# Patient Record
Sex: MALE | URBAN - METROPOLITAN AREA
[De-identification: names, ages, dates, MRNs, and addresses within clinical notes are randomized per-mention and may not be internally consistent; named-entity substitution may affect disease eponyms.]

---

## 2017-07-31 ENCOUNTER — OFFICE VISIT (OUTPATIENT)
Dept: ORTHOPEDIC SURGERY | Age: 37
End: 2017-07-31

## 2017-07-31 VITALS
SYSTOLIC BLOOD PRESSURE: 128 MMHG | HEIGHT: 74 IN | HEART RATE: 71 BPM | OXYGEN SATURATION: 98 % | RESPIRATION RATE: 14 BRPM | DIASTOLIC BLOOD PRESSURE: 86 MMHG | WEIGHT: 210 LBS | BODY MASS INDEX: 26.95 KG/M2

## 2017-07-31 DIAGNOSIS — M25.571 RIGHT ANKLE PAIN, UNSPECIFIED CHRONICITY: ICD-10-CM

## 2017-07-31 DIAGNOSIS — S93.411A SPRAIN OF CALCANEOFIBULAR LIGAMENT OF RIGHT ANKLE, INITIAL ENCOUNTER: Primary | ICD-10-CM

## 2017-07-31 DIAGNOSIS — M79.671 RIGHT FOOT PAIN: ICD-10-CM

## 2017-07-31 NOTE — MR AVS SNAPSHOT
Visit Information Date & Time Provider Department Dept. Phone Encounter #  
 7/31/2017  9:00 AM Kenneth Miranda, [facility] [phone] [id number] Follow-up Instructions Return in about 3 weeks (around 8/21/2017). Follow-up and Disposition History Upcoming Health Maintenance Date Due DTaP/Tdap/Td series (1 - Tdap) 3/17/2000 INFLUENZA AGE 9 TO ADULT 8/1/2017 Allergies as of 7/31/2017  Review Complete On: 7/31/2017 By: Kenneth Miranda MD  
 No Known Allergies Current Immunizations  Never Reviewed No immunizations on file. Not reviewed this visit You Were Diagnosed With   
  
 Codes Comments Sprain of calcaneofibular ligament of right ankle, initial encounter    -  Primary ICD-10-CM: A15.221N ICD-9-CM: 845.02 Right foot pain     ICD-10-CM: M79.671 ICD-9-CM: 729.5 Right ankle pain, unspecified chronicity     ICD-10-CM: M25.571 ICD-9-CM: 719.47 Vitals BP Pulse Resp Height(growth percentile) Weight(growth percentile) SpO2  
 128/86 71 14 6' 2\" (1.88 m) 210 lb (95.3 kg) 98% BMI Smoking Status 26.96 kg/m2 Current Every Day Smoker Vitals History BMI and BSA Data Body Mass Index Body Surface Area  
 26.96 kg/m 2 2.23 m 2 Your Updated Medication List  
  
Notice  As of 7/31/2017 10:39 AM  
 You have not been prescribed any medications. We Performed the Following AMB POC XRAY, FOOT; COMPLETE, 3+ VIEW [92377 CPT(R)] AMB SUPPLY ORDER [[id number] Custom] Comments:  
 Custom right hinged AFO brace POC XRAY, ANKLE; 2 VIEWS [02115 CPT(R)] REFERRAL TO PHYSICAL THERAPY [XAR27 Custom] Comments:  
 Evaluation and treatment of right ankle sprain Follow-up Instructions Return in about 3 weeks (around 8/21/2017). Referral Information Referral ID Referred By Referred To [id number] [address] [facility] [address] [facility] VIEW   
   [address] [facility] [address] [place], [address] [place] [address] Phone: [phone] Fax: [phone] Visits Status Start Date End Date 1 New Request 7/31/17 7/31/18 If your referral has a status of pending review or denied, additional information will be sent to support the outcome of this decision. Patient Instructions Please follow up in 3 weeks. You are advised to contact us if your condition worsens. Ankle Sprain: Care Instructions Your Care Instructions An ankle sprain can happen when you twist your ankle. The ligaments that support the ankle can get stretched and torn. Often the ankle is swollen and painful. Ankle sprains may take from several weeks to several months to heal. Usually, the more pain and swelling you have, the more severe your ankle sprain is and the longer it will take to heal. You can heal faster and regain strength in your ankle with good home treatment. It is very important to give your ankle time to heal completely, so that you do not easily hurt your ankle again. Follow-up care is a key part of your treatment and safety. Be sure to make and go to all appointments, and call your doctor if you are having problems. It's also a good idea to know your test results and keep a list of the medicines you take. How can you care for yourself at home? · Prop up your foot on pillows as much as possible for the next 3 days. Try to keep your ankle above the level of your heart. This will help reduce the swelling. · Follow your doctor's directions for wearing a splint or elastic bandage. Wrapping the ankle may help reduce or prevent swelling. · Your doctor may give you a splint, a brace, an air stirrup, or another form of ankle support to protect your ankle until it is healed. Wear it as directed while your ankle is healing.  Do not remove it unless your doctor tells you to. After your ankle has healed, ask your doctor whether you should wear the brace when you exercise. · Put ice or cold packs on your injured ankle for 10 to 20 minutes at a time. Try to do this every 1 to 2 hours for the next 3 days (when you are awake) or until the swelling goes down. Put a thin cloth between the ice and your skin. · You may need to use crutches until you can walk without pain. If you do use crutches, try to bear some weight on your injured ankle if you can do so without pain. This helps the ankle heal. 
· Take pain medicines exactly as directed. ¨ If the doctor gave you a prescription medicine for pain, take it as prescribed. ¨ If you are not taking a prescription pain medicine, ask your doctor if you can take an over-the-counter medicine. · If you have been given ankle exercises to do at home, do them exactly as instructed. These can promote healing and help prevent lasting weakness. When should you call for help? Call your doctor now or seek immediate medical care if: 
· Your pain is getting worse. · Your swelling is getting worse. · Your splint feels too tight or you are unable to loosen it. Watch closely for changes in your health, and be sure to contact your doctor if: 
· You are not getting better after 1 week. Where can you learn more? Go to http://naa-brijesh.info/. Enter P420 in the search box to learn more about \"Ankle Sprain: Care Instructions. \" Current as of: March 21, 2017 Content Version: 11.3 © 9627-3176 Healthwise, Incorporated. Care instructions adapted under license by [facility] (which disclaims liability or warranty for this information). If you have questions about a medical condition or this instruction, always ask your healthcare professional. [place] [address] any warranty or liability for your use of this information. Introducing [facility]! Ramandeep Mcdonald introduces [facility] patient portal. Now you can access parts of your medical record, email your doctor's office, and request medication refills online. 1. In your internet browser, go to [email or web address] 2. Click on the First Time User? Click Here link in the Sign In box. You will see the New Member Sign Up page. 3. Enter your [facility] Access Code exactly as it appears below. You will not need to use this code after youve completed the sign-up process. If you do not sign up before the expiration date, you must request a new code. · [facility] Access Code: [email or web address] Expires: 10/29/2017 10:32 AM 
 
4. Enter the last four digits of your Social Security Number (xxxx) and Date of Birth (mm/dd/yyyy) as indicated and click Submit. You will be taken to the next sign-up page. 5. Create a [facility] ID. This will be your [facility] login ID and cannot be changed, so think of one that is secure and easy to remember. 6. Create a [facility] password. You can change your password at any time. 7. Enter your Password Reset Question and Answer. This can be used at a later time if you forget your password. 8. Enter your e-mail address. You will receive e-mail notification when new information is available in [address] 9. Click Sign Up. You can now view and download portions of your medical record. 10. Click the Download Summary menu link to download a portable copy of your medical information. If you have questions, please visit the Frequently Asked Questions section of the [facility] [facility] website. Remember, [facility] is NOT to be used for urgent needs. For medical emergencies, dial 911. Now available from your iPhone and Android! Please provide this summary of care documentation to your next provider. If you have any questions after today's visit, please call [phone].

## 2017-07-31 NOTE — LETTER
NOTIFICATION RETURN TO WORK / SCHOOL 
 
7/31/2017 10:33 AM 
 
Mr. Ruth Dixon [facility] 
[address] [facility] [phone] [id number] To Whom It May Concern: 
 
Ruth Dixon is currently under the care of [address] [place] [address] [address]. Mr. Shira Quigley presents to my office on 7/31/2017 for evaluation and treatment of his right foot. Please allow Mr. Shira Quigley to continue working on light duty until follow-up. If there are questions or concerns please have the patient contact our office.  
 
 
 
Sincerely, 
 
 
Kristi Ralph MD

## 2017-07-31 NOTE — PATIENT INSTRUCTIONS
Please follow up in 3 weeks. You are advised to contact us if your condition worsens. Ankle Sprain: Care Instructions  Your Care Instructions    An ankle sprain can happen when you twist your ankle. The ligaments that support the ankle can get stretched and torn. Often the ankle is swollen and painful. Ankle sprains may take from several weeks to several months to heal. Usually, the more pain and swelling you have, the more severe your ankle sprain is and the longer it will take to heal. You can heal faster and regain strength in your ankle with good home treatment. It is very important to give your ankle time to heal completely, so that you do not easily hurt your ankle again. Follow-up care is a key part of your treatment and safety. Be sure to make and go to all appointments, and call your doctor if you are having problems. It's also a good idea to know your test results and keep a list of the medicines you take. How can you care for yourself at home? · Prop up your foot on pillows as much as possible for the next 3 days. Try to keep your ankle above the level of your heart. This will help reduce the swelling. · Follow your doctor's directions for wearing a splint or elastic bandage. Wrapping the ankle may help reduce or prevent swelling. · Your doctor may give you a splint, a brace, an air stirrup, or another form of ankle support to protect your ankle until it is healed. Wear it as directed while your ankle is healing. Do not remove it unless your doctor tells you to. After your ankle has healed, ask your doctor whether you should wear the brace when you exercise. · Put ice or cold packs on your injured ankle for 10 to 20 minutes at a time. Try to do this every 1 to 2 hours for the next 3 days (when you are awake) or until the swelling goes down. Put a thin cloth between the ice and your skin. · You may need to use crutches until you can walk without pain.  If you do use crutches, try to bear some weight on your injured ankle if you can do so without pain. This helps the ankle heal.  · Take pain medicines exactly as directed. ¨ If the doctor gave you a prescription medicine for pain, take it as prescribed. ¨ If you are not taking a prescription pain medicine, ask your doctor if you can take an over-the-counter medicine. · If you have been given ankle exercises to do at home, do them exactly as instructed. These can promote healing and help prevent lasting weakness. When should you call for help? Call your doctor now or seek immediate medical care if:  · Your pain is getting worse. · Your swelling is getting worse. · Your splint feels too tight or you are unable to loosen it. Watch closely for changes in your health, and be sure to contact your doctor if:  · You are not getting better after 1 week. Where can you learn more? Go to http://naa-brijesh.info/. Enter T242 in the search box to learn more about \"Ankle Sprain: Care Instructions. \"  Current as of: March 21, 2017  Content Version: 11.3  © 4905-3508 Healthwise, Incorporated. Care instructions adapted under license by [employer] (which disclaims liability or warranty for this information). If you have questions about a medical condition or this instruction, always ask your healthcare professional. [address] any warranty or liability for your use of this information.

## 2017-07-31 NOTE — PROCEDURES
X-rays of the right ankle reveals pes cavus alignment, soft tissue swelling medially and laterally, no osteochondral defect is seen. Foot films, three views, reveal pes cavus alignment and metatarsus adductus, bipartite medial sesamoid, and marked bunion deformity in these nonweightbearing films. No significant spurs are seen to the superior and/or inferior surface of the calcaneus. Attention right ankle, medial malleolar region:  No acute fracture, subluxation, or dislocation is seen when looking at the ankle. The MRI report from [facility] is listed in the body of my report. The MRI did reveal mild bone marrow edema to the inferior talus and medial malleolus anteriorly and posteriorly. No focal fracture. There is a tear of the anterior talofibular ligament, complete tear, and partial tear of the medial deltoid ligament, as there is some edema within the ligament. There is an adjacent bone marrow contusion to the inferior medial malleolus and to the adjacent medial talus.

## 2017-07-31 NOTE — PROGRESS NOTES
AMBULATORY PROGRESS NOTE      Patient: Jose G Echevarria             MRN: [id number]     SSN: [id number] Body mass index is 26.96 kg/(m^2). YOB: 1980     AGE: 44 y.o. EX: male    PCP: No primary care provider on file. IMPRESSION/DIAGNOSIS AND TREATMENT PLAN     DIAGNOSES  1. Sprain of calcaneofibular ligament of right ankle, initial encounter    2. Right foot pain    3. Right ankle pain, unspecified chronicity        Orders Placed This Encounter    AMB SUPPLY ORDER    [64524] Foot Min 3V    [45553] Ankle 2V    REFERRAL TO PHYSICAL THERAPY      Jose G Echevarria understands his diagnoses and the proposed plan. The patient understands he has a severe ankle sprain. It will take a while for the medial malleolar deltoid ligament to heal, as well as the lateral ligaments to heal.  He is going to require a short, hinged, AFO type brace to help protect him for at least the next four to six weeks to allow him to at least to get in to some sort of tennis shoe rather than a CAM walker boot. At this current time, he will sill us extra-articular CAM walker boot and the crutches. We will see him again in three weeks. Plan:    1) Custom Hinged AFO Brace  2) PWB Status  3) Order Theraband  4) Referral to Physical Therapy  5) Work Note    RTO - 3 weeks    HPI AND EXAMINATION     Jose G Echevarria IS A 44 y.o. male who presents to my outpatient office complaining of right ankle pain. He arrives here with an MRI from [facility], as the patient did see Dr. Courtney Morris, in Orthopedics at [facility].  This MRI was indicated due to a right ankle sprain that occurred three weeks ago with a popping sound, and the patient had pain and decreased range of motion and had some swelling of the ankle. HIs MRI of the right ankle was obtained on July 26, 2017, again at [facility].  The overall findings reveal:    1.  Acute, essentially complete tear of the anterior talofibular ligament. 2. Partial tear and edema to the deltoid ligament, adjacent bone marrow contusion to the inferior medial malleolus with bone contusion to the medial talus. 3. Small ankle effusion. 4. Mild tendinosis to the distal portion of the posterior tibial tendon versus sprain. 5. Moderate soft tissue edema. The patient states that he visited Dr. Damián Watts prior to coming into the office today. Mr. Sade Car states that the initial trauma occurred on July 4, 2017. He states that during the time of the trauma he heard a loud pop coming from his ankle. He has returned to work already and states that he is a . He mentions that operating does not require him to use his right foot. Imaging was discussed with the patient. The patient was instructed about using gastrointestinal medication to protect against anti-inflammatory medication. Appearance: Alert, well appearing and pleasant patient who is in no distress, oriented to person, place/time, and who follows commands. Psychiatric: Affect and mood are appropriate. Cardiovascular/Peripheral Vascular: Normal Pulses to each hand and foot    Musculoskeletal:  LOCATION:  Right FOOT/ANKLE  Integumentary: No rashes, skin patches, wounds, or abrasions to the right or left legs       Warm and normal color. No regions of expressible drainage.     Swelling to lateral Ankle mild present    Swelling to Medial Ankle moderate present       Gait: nonambulatory      Tenderness: ATFL/CFL Anterolateral ankle ligaments tenderness is moderate present        Anterior Syndesmosis is not present        Medial deltoid ligament tenderness is present        Peroneal tendon sheath no swelling         4/5 Metatarsal base tenderness is not present        Midfoot tenderness is not present        Achilles tenderness is not present                    Cuboid tenderness is not present         Proximal fibula tenderness: is not present Anteromedial tenderness        Posterior Tibialis tenderness PRESENT       Motor Strength/Tone Exam: Normal to the toes with respect to extension/flexion      Sensory Exam:   Intact Normal Sensation to ankle/foot      Stability Testing: Peroneal tendon instability : Not tested due to pain,   Instability of Ankle/Subtalar: Not tested due to pain,         ROM: Decreased ROM noted to ankle      Decreased Hindfoot (Subtalar, CC, TN regions)        Normal Forefoot toes        Contractures: No Achilles or Gastrocnemius Contractures      Calf tenderness: Absent for calf or gastrocnemius muscle regions       Soft, supple, non tender, non taut lower extremity compartments       Alignment: Pes Cavus  Wounds/Abrasions:   None present  Extremities:   No embolic phenomena to the toes or hands         No significant edema to the foot and or toes. Lower extremities are warm and appear well perfused    DVT: No evidence of DVT seen on examination at this time    No calf swelling, no tenderness to calf muscles  Lymphatic:  No Evidence of Lymphedema  Vascular: Medial Border of Tibia Region: Edema is not present        Pulses: Dorsalis Pedis &  Posterior Tibial Pulses : Palpable yes        Varicosities Lower Limbs :  None    Neuro: Negative bilateral Straight leg raise (seated position)    See Musculoskeletal section for pertinent individual extremity examination    No abnormal hand/wrist, foot/ankle, or facial/neck tremors. CHART REVIEW     History reviewed. No pertinent past medical history. No current outpatient prescriptions on file. No current facility-administered medications for this visit. No Known Allergies  History reviewed. No pertinent surgical history. Social History     Occupational History    Not on file.      Social History Main Topics    Smoking status: Current Every Day Smoker     Packs/day: 1.00     Years: 20.00    Smokeless tobacco: Never Used    Alcohol use Yes      Comment: occasionally    Drug use: Not on file    Sexual activity: Not on file     Family History   Problem Relation Age of Onset    No Known Problems Mother     No Known Problems Father         REVIEW OF SYSTEMS : 7/31/2017  ALL BELOW ARE Negative except : SEE HPI     CONSTITUTIONAL: denies chills, fatigue, fever, weight change   PSYCH: denies anxiety, depression, irritability or mood swings   ENT: denies - headaches, hearing change, nasal congestion, oral lesions, or sore throat   HEM/ONC denies - bleeding problems, bruising, pallor or swollen lymph nodes   ENDO: denies hot flashes, polydipsia/polyuria or temperature intolerance   RESP: denies - cough, shortness of breath or wheezing   CV: denies - chest pain, edema or palpitations, BATISTA  GI: denies - abdominal pain, change in bowel habits, constipation, diarrhea or nausea/vomiting   : denies - dysuria, hematuria, incontinence, pelvic pain   MSK: denies  - See HPI. NEURO: denies - confusion, headaches, seizures or weakness   DERM: denies - dry skin, hair changes, rash or skin lesion changes  VASCULAR: Peripheral Vascular: No calf pain, vascular vein tenderness to calf pain              No calf throbbing, posterior knee throbbing pain     DIAGNOSTIC IMAGING      Dictation on: 07/31/2017 10:35 AM by: Mina Rios [[id number]]         Written by Franck Snyder, as dictated by Alex Trejo MD. Dr. DAVI, Alex Trejo MD, confirm that all documentation is accurate.